# Patient Record
Sex: FEMALE | Race: WHITE | NOT HISPANIC OR LATINO | Employment: FULL TIME | URBAN - METROPOLITAN AREA
[De-identification: names, ages, dates, MRNs, and addresses within clinical notes are randomized per-mention and may not be internally consistent; named-entity substitution may affect disease eponyms.]

---

## 2017-02-15 ENCOUNTER — TELEPHONE (OUTPATIENT)
Dept: OBSTETRICS AND GYNECOLOGY | Facility: CLINIC | Age: 52
End: 2017-02-15

## 2017-02-15 DIAGNOSIS — Z12.31 VISIT FOR SCREENING MAMMOGRAM: Primary | ICD-10-CM

## 2017-02-15 NOTE — TELEPHONE ENCOUNTER
----- Message from Ivory Cervantes sent at 2/15/2017 11:52 AM CST -----  Contact: pt  Pt wants to have a mammo done on 3/15, no orders in system pt wants a call once mammo orders are in to schedule appt, pt can be reached at [phone]///[id number]

## 2017-02-15 NOTE — TELEPHONE ENCOUNTER
Left message informing patient that mammogram was scheduled at 2:45pm on 3/15/2017 [facility] location.

## 2017-03-15 ENCOUNTER — HOSPITAL ENCOUNTER (OUTPATIENT)
Dept: RADIOLOGY | Facility: HOSPITAL | Age: 52
Discharge: HOME | End: 2017-03-15
Attending: OBSTETRICS & GYNECOLOGY
Payer: COMMERCIAL

## 2017-03-15 ENCOUNTER — OFFICE VISIT (OUTPATIENT)
Dept: OBSTETRICS AND GYNECOLOGY | Facility: CLINIC | Age: 52
End: 2017-03-15
Payer: COMMERCIAL

## 2017-03-15 VITALS
DIASTOLIC BLOOD PRESSURE: 78 MMHG | SYSTOLIC BLOOD PRESSURE: 122 MMHG | HEIGHT: 60 IN | BODY MASS INDEX: 28.13 KG/M2 | WEIGHT: 143.31 LBS

## 2017-03-15 DIAGNOSIS — Z01.419 ENCOUNTER FOR GYNECOLOGICAL EXAMINATION: Primary | ICD-10-CM

## 2017-03-15 DIAGNOSIS — Z12.31 VISIT FOR SCREENING MAMMOGRAM: ICD-10-CM

## 2017-03-15 PROCEDURE — 77063 BREAST TOMOSYNTHESIS BI: CPT | Mod: 26,,, | Performed by: RADIOLOGY

## 2017-03-15 PROCEDURE — 77067 SCR MAMMO BI INCL CAD: CPT | Mod: TC

## 2017-03-15 PROCEDURE — 77067 SCR MAMMO BI INCL CAD: CPT | Mod: 26,,, | Performed by: RADIOLOGY

## 2017-03-15 PROCEDURE — 99999 PR PBB SHADOW E&M-EST. PATIENT-LVL II: CPT | Mod: PBBFAC,,, | Performed by: OBSTETRICS & GYNECOLOGY

## 2017-03-15 PROCEDURE — 99396 PREV VISIT EST AGE 40-64: CPT | Mod: S$GLB,,, | Performed by: OBSTETRICS & GYNECOLOGY

## 2017-03-15 NOTE — PROGRESS NOTES
Candi Guzman is a 51 y.o.  who presents for   Chief Complaint   Patient presents with    Gynecologic Exam     Annual Exam- No Problems    - S/P [facility]    Currently sexually active -  mut monog     Candi has a 7 month old grandson named Lennox       Last mammogram 2016 Normal (today)   Last colonoscopy  Normal, repeat in 10 years,per pt      Past Medical History:   Diagnosis Date    Hx of peptic ulcer     Hypertension     Thyroid disease        Past Surgical History:   Procedure Laterality Date     SECTION      x 1    HYSTERECTOMY      [facility], for bleeding, ovaries retained    THYROIDECTOMY      TUBAL LIGATION         Current Outpatient Prescriptions   Medication Sig Dispense Refill    levothyroxine (SYNTHROID) 100 MCG tablet       lisinopril-hydrochlorothiazide (PRINZIDE,ZESTORETIC) 20-12.5 mg per tablet       omeprazole (PRILOSEC) 20 MG capsule Take 20 mg by mouth once daily.       No current facility-administered medications for this visit.        Family History   Problem Relation Age of Onset    Breast cancer Mother 50    Thrombophilia Father      DVTs, spontaneous    Colon cancer Neg Hx     Ovarian cancer Neg Hx        Review of patient's allergies indicates:   Allergen Reactions    Codeine     Dilantin [phenytoin sodium extended]     Meperidine     Morphine     Sulfa (sulfonamide antibiotics)        Social History   Substance Use Topics    Smoking status: Never Smoker    Smokeless tobacco: None    Alcohol use 0.0 oz/week     0 Standard drinks or equivalent per week      Comment: socially       /78  Ht 5' (1.524 m)  Wt 65 kg (143 lb 4.8 oz)  BMI 27.99 kg/m2    ROS:  GENERAL: Doing well and no acute complaints.   BREASTS: No lumps, tenderness, discharge.  GI: No nausea, vomiting, melena, hematochezia.  : No significant incontinence, dysuria, hematuria.  GYN: No unusual discharge, pain, or vasomotor symptoms.    GEN: Pleasant patient in no distress.  Alert and oriented.  HEAD and NECK: Normocephalic. No adenopathy. Thyroid normal to inspection and palpation.  SKIN: No hirsutism or lesions.  BREASTS: Normal to inspection and exam ( no suspicious masses, tenderness, or axillary adenopathy). No discharge.   ABDOMEN:  Flat, soft and non tender. No hernia, mass, hepatomegaly, or RUQT.   PELVIC:      Vulva: normal female genitalia. No lesions.       Urethra: normal        Vagina:  Moist, no abnormal discharge, no significant cystocele or rectocele      Cuff: Intact, well supported and free of lesion. No bladder base tenderness.      Adnexa: No masses, tenderness, or CDS nodularity.          IMPRESSION: nl exam    COUNSELING:  - Encouraged continued calcium and Vitamin D daily and regular weight bearing exercise for osteoporosis prevention.    PLAN: routine f/u

## 2018-01-23 ENCOUNTER — PATIENT MESSAGE (OUTPATIENT)
Dept: OBSTETRICS AND GYNECOLOGY | Facility: CLINIC | Age: 53
End: 2018-01-23

## 2018-01-23 DIAGNOSIS — Z12.31 SCREENING MAMMOGRAM, ENCOUNTER FOR: Primary | ICD-10-CM

## 2018-01-23 NOTE — TELEPHONE ENCOUNTER
Pt was seeing Dr. Jackson, but now is scheduled to come in to see you for annual exam and would like her mammogram on the same day.      Please sign orders for screening mammogram.

## 2018-03-23 ENCOUNTER — PATIENT MESSAGE (OUTPATIENT)
Dept: OBSTETRICS AND GYNECOLOGY | Facility: CLINIC | Age: 53
End: 2018-03-23

## 2018-03-26 ENCOUNTER — PATIENT MESSAGE (OUTPATIENT)
Dept: ADMINISTRATIVE | Facility: OTHER | Age: 53
End: 2018-03-26

## 2018-04-05 ENCOUNTER — PATIENT MESSAGE (OUTPATIENT)
Dept: OBSTETRICS AND GYNECOLOGY | Facility: CLINIC | Age: 53
End: 2018-04-05

## 2018-04-09 ENCOUNTER — PATIENT MESSAGE (OUTPATIENT)
Dept: OBSTETRICS AND GYNECOLOGY | Facility: CLINIC | Age: 53
End: 2018-04-09

## 2018-04-12 ENCOUNTER — OFFICE VISIT (OUTPATIENT)
Dept: OBSTETRICS AND GYNECOLOGY | Facility: CLINIC | Age: 53
End: 2018-04-12
Payer: COMMERCIAL

## 2018-04-12 ENCOUNTER — HOSPITAL ENCOUNTER (OUTPATIENT)
Dept: RADIOLOGY | Facility: HOSPITAL | Age: 53
Discharge: HOME | End: 2018-04-12
Attending: OBSTETRICS & GYNECOLOGY
Payer: COMMERCIAL

## 2018-04-12 VITALS
WEIGHT: 125 LBS | BODY MASS INDEX: 28.07 KG/M2 | WEIGHT: 143 LBS | HEIGHT: 60 IN | BODY MASS INDEX: 24.54 KG/M2 | DIASTOLIC BLOOD PRESSURE: 84 MMHG | SYSTOLIC BLOOD PRESSURE: 122 MMHG | HEIGHT: 60 IN

## 2018-04-12 DIAGNOSIS — Z12.31 SCREENING MAMMOGRAM, ENCOUNTER FOR: ICD-10-CM

## 2018-04-12 DIAGNOSIS — Z01.419 WELL WOMAN EXAM WITH ROUTINE GYNECOLOGICAL EXAM: Primary | ICD-10-CM

## 2018-04-12 PROCEDURE — 77067 SCR MAMMO BI INCL CAD: CPT | Mod: TC,PO

## 2018-04-12 PROCEDURE — 77067 SCR MAMMO BI INCL CAD: CPT | Mod: 26,,, | Performed by: RADIOLOGY

## 2018-04-12 PROCEDURE — 77063 BREAST TOMOSYNTHESIS BI: CPT | Mod: 26,,, | Performed by: RADIOLOGY

## 2018-04-12 PROCEDURE — 99999 PR PBB SHADOW E&M-EST. PATIENT-LVL II: CPT | Mod: PBBFAC,,, | Performed by: OBSTETRICS & GYNECOLOGY

## 2018-04-12 PROCEDURE — 99396 PREV VISIT EST AGE 40-64: CPT | Mod: S$GLB,,, | Performed by: OBSTETRICS & GYNECOLOGY

## 2018-04-12 NOTE — PROGRESS NOTES
CHIEF COMPLAINT   Gynecologic Exam  Chief Complaint   Patient presents with    Gynecologic Exam        HISTORY OF PRESENT ILLNESS  Patient presents for annual exam. The patient has no complaints today.     No LMP recorded. Patient has had a hysterectomy. benign indications.  Ovaries remain.       Reports no bowel movement irregularities from baseline, bloating, or weight loss   ERT:   None        Health Maintenance   Topic Date Due    Hepatitis C Screening  1965    TETANUS VACCINE  1983    Colonoscopy  2015    Influenza Vaccine  2017    Lipid Panel  2017    Mammogram  2019       HISTORY  Patient Active Problem List   Diagnosis   (none) - all problems resolved or deleted       Past Medical History:   Diagnosis Date    Hx of peptic ulcer     Hypertension     Thyroid disease        Past Surgical History:   Procedure Laterality Date    BREAST BIOPSY Left 2015     SECTION      x 1    HYSTERECTOMY      [facility], for bleeding, ovaries retained    THYROIDECTOMY      TUBAL LIGATION         Family History   Problem Relation Age of Onset    Breast cancer Mother 50    Thrombophilia Father      DVTs, spontaneous    Colon cancer Neg Hx     Ovarian cancer Neg Hx        Social History     Social History    Marital status:      Spouse name: N/A    Number of children: N/A    Years of education: N/A     Social History Main Topics    Smoking status: Never Smoker    Smokeless tobacco: Never Used    Alcohol use 0.0 oz/week      Comment: socially    Drug use: No    Sexual activity: Yes     Partners: Male     Birth control/ protection: Surgical      Comment: hyst; mut monog     Other Topics Concern    None     Social History Narrative    None       Current Outpatient Prescriptions   Medication Sig Dispense Refill    levothyroxine (SYNTHROID) 100 MCG tablet       lisinopril-hydrochlorothiazide (PRINZIDE,ZESTORETIC) 20-12.5 mg per tablet       omeprazole  (PRILOSEC) 20 MG capsule Take 20 mg by mouth once daily.       No current facility-administered medications for this visit.        Review of patient's allergies indicates:   Allergen Reactions    Codeine     Dilantin [phenytoin sodium extended]     Meperidine     Morphine     Sulfa (sulfonamide antibiotics)          PHYSICAL EXAM     Vitals:    04/12/18 0744   BP: 122/84       PAIN SCALE: 0/10 None    ROS:  GENERAL: No fever, chills, fatigability or weight loss.  ABDOMEN: Appetite fine. No weight loss. Denies diarrhea, abdominal pain, hematemesis or blood in stool.  No change in bowel movement pattern.  URINARY: No flank pain, dysuria or hematuria.  REPRODUCTIVE: No abnormal vaginal bleeding.  BREASTS: Breasts symmetric, nontender and no lumps detected.    PE:   APPEARANCE: Well nourished, well developed, in no acute distress.  NECK: Neck symmetric without masses (s/p  Thyroidectomy)      NODES: No inguinal lymph node enlargement.  ABDOMEN: Soft. No tenderness or masses. No hepatosplenomegaly. No hernias.  BREASTS: Symmetrical, no skin changes or visible lesions. No palpable masses, nipple discharge or adenopathy bilaterally.    PELVIC:   VULVA: No lesions. Normal female genitalia.  URETHRAL MEATUS: Normal size and location, no lesions, no prolapse.  URETHRA: No masses, tenderness, prolapse or scarring.  PELVIC: Normal external female genitalia without lesions. Normal hair distribution. Adequate perineal body, normal urethral meatus. Vagina moist and well rugated without lesions or discharge. No significant cystocele or rectocele. Uterus and cervix surgically absent. Bimanual exam revealed no masses, tenderness or abnormality.    ANUS, PERINEUM: no masses, external hemorrhoids noted.       DIAGNOSIS:   1. Annual exam      PLAN:    Mammogram  Colonoscopy  dexa     MEDICATIONS PRESCRIBED: calcium vitamin D weight bearing exercise       COUNSELING:  Patient was counseled today on A.C.S. Pap guidelines and  recommendations for yearly pelvic exams, mammograms and monthly self breast exams; to see her PCP for other health maintenance.        FOLLOW-UP: With me in 1 year.

## 2018-04-15 ENCOUNTER — PATIENT MESSAGE (OUTPATIENT)
Dept: OBSTETRICS AND GYNECOLOGY | Facility: CLINIC | Age: 53
End: 2018-04-15

## 2018-04-16 ENCOUNTER — TELEPHONE (OUTPATIENT)
Dept: OBSTETRICS AND GYNECOLOGY | Facility: CLINIC | Age: 53
End: 2018-04-16

## 2018-04-16 NOTE — PROGRESS NOTES
Mammogram normal but radiology calculated risk is over 20%.  Please schedule with Jany Clifford for counseling on possible further testing.

## 2018-04-16 NOTE — TELEPHONE ENCOUNTER
----- Message from Sarah Florentino MD sent at 4/16/2018  9:12 AM CDT -----  Mammogram normal but radiology calculated risk is over 20%.  Please schedule with Jany Parry for counseling on possible further testing.

## 2018-04-17 ENCOUNTER — TELEPHONE (OUTPATIENT)
Dept: HEMATOLOGY/ONCOLOGY | Facility: CLINIC | Age: 53
End: 2018-04-17

## 2018-04-17 NOTE — TELEPHONE ENCOUNTER
Attempted to contact patient about upcoming appointment. Scheduled for 5/16/18 at 200pm. No answer. Left voicemail with callback number.

## 2018-04-25 ENCOUNTER — PATIENT MESSAGE (OUTPATIENT)
Dept: OBSTETRICS AND GYNECOLOGY | Facility: CLINIC | Age: 53
End: 2018-04-25

## 2018-04-25 DIAGNOSIS — R92.8 BREAST LESION ON MAMMOGRAPHY: Primary | ICD-10-CM

## 2018-04-26 ENCOUNTER — TELEPHONE (OUTPATIENT)
Dept: NEPHROLOGY | Facility: CLINIC | Age: 53
End: 2018-04-26

## 2018-04-26 ENCOUNTER — TELEPHONE (OUTPATIENT)
Dept: OBSTETRICS AND GYNECOLOGY | Facility: CLINIC | Age: 53
End: 2018-04-26

## 2018-04-26 NOTE — TELEPHONE ENCOUNTER
Spoke to patient and read verbatim her ultrasound report. Patient had not seen Dr. Florentino's message about her ultrasound results and the reasoning behind seeing breast specialist.  Patient concerned because of family history of breast CA.  Patient thanked me for calling her and will proceed with appointments as scheduled.

## 2018-04-26 NOTE — TELEPHONE ENCOUNTER
----- Message from Ruth Lua sent at 4/26/2018  8:44 AM CDT -----  Contact: pt  Calling about a missed call and please advise. [phone]

## 2018-04-26 NOTE — TELEPHONE ENCOUNTER
pls tell pt I spoke w adalberto chiu and she recommended indeed to get additional mammogram imaging along with an ultrasound before the visit with her. Please schedule.

## 2018-04-26 NOTE — TELEPHONE ENCOUNTER
----- Message from Marlene Corrales sent at 4/26/2018  1:46 PM CDT -----  Contact: pt  She's calling in regards to speak with nurse pls call pt back at  [phone]

## 2018-05-08 ENCOUNTER — TELEPHONE (OUTPATIENT)
Dept: RADIOLOGY | Facility: HOSPITAL | Age: 53
End: 2018-05-08

## 2018-05-08 ENCOUNTER — HOSPITAL ENCOUNTER (OUTPATIENT)
Dept: RADIOLOGY | Facility: HOSPITAL | Age: 53
Discharge: HOME | End: 2018-05-08
Attending: OBSTETRICS & GYNECOLOGY
Payer: COMMERCIAL

## 2018-05-08 DIAGNOSIS — R92.8 BREAST LESION ON MAMMOGRAPHY: ICD-10-CM

## 2018-05-08 PROCEDURE — 77061 BREAST TOMOSYNTHESIS UNI: CPT | Mod: 26,LT,, | Performed by: RADIOLOGY

## 2018-05-08 PROCEDURE — 76642 ULTRASOUND BREAST LIMITED: CPT | Mod: 26,LT,, | Performed by: RADIOLOGY

## 2018-05-08 PROCEDURE — 77061 BREAST TOMOSYNTHESIS UNI: CPT | Mod: TC,PO,LT

## 2018-05-08 PROCEDURE — 77065 DX MAMMO INCL CAD UNI: CPT | Mod: TC,PO,LT

## 2018-05-08 PROCEDURE — 76642 ULTRASOUND BREAST LIMITED: CPT | Mod: TC,PO,LT

## 2018-05-08 PROCEDURE — 77065 DX MAMMO INCL CAD UNI: CPT | Mod: 26,LT,, | Performed by: RADIOLOGY

## 2018-05-09 ENCOUNTER — OFFICE VISIT (OUTPATIENT)
Dept: SURGERY | Facility: CLINIC | Age: 53
End: 2018-05-09
Payer: COMMERCIAL

## 2018-05-09 ENCOUNTER — PATIENT MESSAGE (OUTPATIENT)
Dept: SURGERY | Facility: CLINIC | Age: 53
End: 2018-05-09

## 2018-05-09 ENCOUNTER — TELEPHONE (OUTPATIENT)
Dept: OBSTETRICS AND GYNECOLOGY | Facility: CLINIC | Age: 53
End: 2018-05-09

## 2018-05-09 VITALS
WEIGHT: 125 LBS | DIASTOLIC BLOOD PRESSURE: 83 MMHG | TEMPERATURE: 98 F | BODY MASS INDEX: 24.41 KG/M2 | HEART RATE: 99 BPM | SYSTOLIC BLOOD PRESSURE: 141 MMHG

## 2018-05-09 DIAGNOSIS — R92.8 ABNORMAL MAMMOGRAM OF LEFT BREAST: Primary | ICD-10-CM

## 2018-05-09 PROCEDURE — 3008F BODY MASS INDEX DOCD: CPT | Mod: CPTII,S$GLB,, | Performed by: SURGERY

## 2018-05-09 PROCEDURE — 99999 PR PBB SHADOW E&M-EST. PATIENT-LVL III: CPT | Mod: PBBFAC,,, | Performed by: SURGERY

## 2018-05-09 PROCEDURE — 99204 OFFICE O/P NEW MOD 45 MIN: CPT | Mod: S$GLB,,, | Performed by: SURGERY

## 2018-05-09 NOTE — LETTER
May 9, 2018      Sarah Florentino MD  [address]  [facility] [address]           [facility]  [address] [place] [id number]  Phone: [phone]  Fax: [phone]          Patient: Candi Guzman   MR Number: [id number]   YOB: 1965   Date of Visit: 5/9/2018       Dear Dr. Sarah Florentino:    Thank you for referring Candi Guzman to me for evaluation. Attached you will find relevant portions of my assessment and plan of care.    If you have questions, please do not hesitate to call me. I look forward to following Candi Guzman along with you.    Sincerely,    Arthur Hoyos MD    Enclosure  CC:  No Recipients    If you would like to receive this communication electronically, please contact [email or web address] or [phone] to request more information on [employer] access.    For providers and/or their staff who would like to refer a patient to [facility], please contact us through our one-stop-shop provider referral line, Kayleigh Mayorga, at [phone].    If you feel you have received this communication in error or would no longer like to receive these types of communications, please e-mail [email or web address]

## 2018-05-09 NOTE — TELEPHONE ENCOUNTER
Spoke to patient and let her know that [facility] will have to have a signed release in order to send us records.  She will have to sign at her appointment today.  Patient verbalized understanding.

## 2018-05-09 NOTE — TELEPHONE ENCOUNTER
----- Message from Solange Kaye sent at 5/9/2018  8:52 AM CDT -----  Contact: pt  States she need us to call and request her mammogram records, DOS 1/2/15 from [facility], she has an appt with Dr Hoyos this afternoon. Please call pt [phone] or cell# [phone]. Thank you

## 2018-05-09 NOTE — PROGRESS NOTES
History & Physical    SUBJECTIVE:     History of Present Illness:  Patient is a 52 y.o. female referred for abnormal mammogram of left breast.  Patient denies any palpable breast masses, nipple discharge, breast pain or any other changes.  She previously underwent a left breast biopsy a few years ago which was benign.  Mother was diagnosed with breast cancer in her 50s.  Menarche age age 9,  1st at 23, menopause unsure of age, no HRT    Chief Complaint   Patient presents with    Consult       Review of patient's allergies indicates:   Allergen Reactions    Codeine     Dilantin [phenytoin sodium extended]     Meperidine     Morphine     Sulfa (sulfonamide antibiotics)        Current Outpatient Prescriptions   Medication Sig Dispense Refill    levothyroxine (SYNTHROID) 100 MCG tablet 88 mcg.       lisinopril-hydrochlorothiazide (PRINZIDE,ZESTORETIC) 20-12.5 mg per tablet 1 tablet.       omeprazole (PRILOSEC) 20 MG capsule Take 20 mg by mouth once daily.       No current facility-administered medications for this visit.        Past Medical History:   Diagnosis Date    Hx of peptic ulcer     Hypertension     Thyroid disease      Past Surgical History:   Procedure Laterality Date    BREAST BIOPSY Left 2015     SECTION      x 1    HYSTERECTOMY  2009    [facility], for bleeding, ovaries retained    THYROIDECTOMY      TUBAL LIGATION       Family History   Problem Relation Age of Onset    Breast cancer Mother 50    Thrombophilia Father         DVTs, spontaneous    Colon cancer Neg Hx     Ovarian cancer Neg Hx      Social History   Substance Use Topics    Smoking status: Never Smoker    Smokeless tobacco: Never Used    Alcohol use 0.0 oz/week      Comment: socially        Review of Systems:  Review of Systems   Constitutional: Negative for activity change, appetite change, chills, diaphoresis, fatigue, fever and unexpected weight change.   HENT: Negative for congestion, dental problem, rhinorrhea  and sore throat.    Eyes: Negative for visual disturbance.   Respiratory: Negative for cough, chest tightness, shortness of breath, wheezing and stridor.    Cardiovascular: Negative for chest pain, palpitations and leg swelling.   Gastrointestinal: Negative for abdominal distention, abdominal pain, constipation, diarrhea, nausea and vomiting.   Endocrine: Negative for cold intolerance, heat intolerance, polydipsia, polyphagia and polyuria.   Genitourinary: Negative for difficulty urinating, dysuria, frequency, hematuria and urgency.   Musculoskeletal: Negative for arthralgias, gait problem, myalgias and neck pain.   Skin: Negative for color change, pallor, rash and wound.   Neurological: Negative for dizziness, syncope, weakness, light-headedness, numbness and headaches.   Hematological: Negative for adenopathy. Does not bruise/bleed easily.   Psychiatric/Behavioral: Negative for confusion, decreased concentration and sleep disturbance. The patient is not nervous/anxious.        OBJECTIVE:     Vital Signs (Most Recent)  Temp: 98 °F (36.7 °C) (05/09/18 1326)  Pulse: 99 (05/09/18 1326)  BP: (!) 141/83 (05/09/18 1326)     56.7 kg (125 lb)     Physical Exam:  Physical Exam   Constitutional: She is oriented to person, place, and time. She appears well-developed and well-nourished. No distress.   HENT:   Head: Normocephalic and atraumatic.   Right Ear: External ear normal.   Left Ear: External ear normal.   Eyes: Conjunctivae and EOM are normal. Pupils are equal, round, and reactive to light. No scleral icterus.   Neck: Normal range of motion. Neck supple. No tracheal deviation present. No thyromegaly present.   Cardiovascular: Normal rate, regular rhythm, normal heart sounds and intact distal pulses.  Exam reveals no gallop and no friction rub.    No murmur heard.  Pulmonary/Chest: Effort normal and breath sounds normal. No respiratory distress. She has no wheezes. She has no rales. She exhibits no tenderness. Right  breast exhibits no inverted nipple, no mass, no nipple discharge, no skin change and no tenderness. Left breast exhibits no inverted nipple, no mass, no nipple discharge, no skin change and no tenderness.   Abdominal: Soft. Bowel sounds are normal. She exhibits no distension. There is no tenderness. No hernia.   Musculoskeletal: Normal range of motion. She exhibits no edema, tenderness or deformity.   Lymphadenopathy:     She has no cervical adenopathy.   Neurological: She is alert and oriented to person, place, and time.   Skin: Skin is warm and dry. No rash noted. She is not diaphoretic. No erythema. No pallor.   Psychiatric: She has a normal mood and affect. Her behavior is normal. Judgment and thought content normal.   Vitals reviewed.        Diagnostic Results:  Narrative & Impression     Result:   Mammo Digital Diagnostic Left with Tomosynthesis_CAD  US Breast Left Limited     History:  Patient is 52 y.o. and is seen for a diagnostic mammogram.     Films Compared:  04/12/2018 Mammo Digital Screening Bilat with Tomosynthesis_CAD, 03/15/2017 Mammo Digital Screening Bilat with Tomosynthesis_CAD, 02/17/2016 Mammo Digital Screening Bilat with Tomosynthesis_CAD, 12/22/2014 Mammo Digital Diagnostic Bilat with Tomosynthesis_CAD, 12/17/2014 Mammo Digital Screening Bilat with Tomosynthesis_CAD, and 11/22/2013 Mammo Digital Screening Bilat With CAD     Findings:  This procedure was performed using tomosynthesis.  Computer-aided detection was utilized in the interpretation of this examination.  US Breast Left Limited  There is a 7 mm x 5 mm x 8 mm irregularly shaped mass with angular margins seen in the left breast at 2 o'clock. There are a couple of more hyperechoic areas within the lesion that are suspicious for calcifications.      There is a 10 mm x 5 mm x 10 mm oval, parallel, hypoechoic mass with circumscribed margins with no posterior features seen in the left breast at 1 o'clock. This lesion is thought to be the  same lesion as seen on an ultrasound from 2007 and is thought to be smaller. The lesion is also thought to be partially visible and unchanged when compared to the CC view of a mammogram dating back to 2014. This lesion is therefore considered benign.     Mammo Digital Diagnostic Left with Tomosynthesis_CAD  The breast is heterogeneously dense, which may obscure small masses.     There is an oval mass with circumscribed margins seen in the left breast at 1 o'clock.       Finding not seen on this modality: mass.      Impression:  Left  Mass: Left breast 7 mm x 5 mm x 8 mm mass at the 2 o'clock position. Assessment: 4 - Suspicious finding. Biopsy is recommended.      BI-RADS Category:   Left: 4 - Suspicious  Overall: 4 - Suspicious     Recommendation:     Biopsy is recommended.     The patient's estimated lifetime risk of breast cancer (to age 85) based on Tyrer-Cuzick - 7 risk assessment model is: Tyrer-Cuzick: 20.82 %. According to the American Cancer Society,  patients with a lifetime breast cancer risk of 20% or higher might benefit from supplemental screening tests.         ASSESSMENT/PLAN:     A 52-year-old female with abnormal mammogram left breast, high risk breast cancer    PLAN:Plan     Ultrasound-guided core biopsy left breast  Call patient with results  Also discussed that she falls into the high risk category for breast cancer screening.  We will schedule next imaging at appropriate time following biopsy results

## 2018-05-14 ENCOUNTER — HOSPITAL ENCOUNTER (OUTPATIENT)
Dept: RADIOLOGY | Facility: HOSPITAL | Age: 53
Discharge: HOME | End: 2018-05-14
Attending: SURGERY
Payer: COMMERCIAL

## 2018-05-14 DIAGNOSIS — R92.8 ABNORMAL MAMMOGRAM OF LEFT BREAST: ICD-10-CM

## 2018-05-14 PROCEDURE — 88305 TISSUE EXAM BY PATHOLOGIST: CPT | Mod: 26,,, | Performed by: PATHOLOGY

## 2018-05-14 PROCEDURE — 19083 BX BREAST 1ST LESION US IMAG: CPT | Mod: LT,,, | Performed by: RADIOLOGY

## 2018-05-14 PROCEDURE — 19083 BX BREAST 1ST LESION US IMAG: CPT | Mod: PO

## 2018-05-14 PROCEDURE — 88305 TISSUE EXAM BY PATHOLOGIST: CPT | Performed by: PATHOLOGY

## 2018-05-14 NOTE — NURSING
Pressure held on left breast biopsy site for 10 mins, hemostasis was achieved, steri strips were applied, and wound was covered with 4x4 gauze and a tegaderm. Dressing clean, dry and intact with no drainage noted.  Discharge instructions given verbally and in writing, patient voiced understandings.  Patient discharged and accompanied by family member.

## 2018-05-17 ENCOUNTER — TELEPHONE (OUTPATIENT)
Dept: SURGERY | Facility: CLINIC | Age: 53
End: 2018-05-17

## 2018-05-17 ENCOUNTER — PATIENT MESSAGE (OUTPATIENT)
Dept: SURGERY | Facility: CLINIC | Age: 53
End: 2018-05-17

## 2018-05-17 NOTE — TELEPHONE ENCOUNTER
----- Message from Julio Cesar Hanna sent at 5/17/2018 12:53 PM CDT -----  Contact: self [phone]  Would like to consult with nurse regarding results.  Please call back at [phone].  Md Yaquelin

## 2018-05-23 ENCOUNTER — PATIENT MESSAGE (OUTPATIENT)
Dept: SURGERY | Facility: CLINIC | Age: 53
End: 2018-05-23

## 2018-05-31 ENCOUNTER — PATIENT MESSAGE (OUTPATIENT)
Dept: SURGERY | Facility: HOSPITAL | Age: 53
End: 2018-05-31

## 2018-05-31 DIAGNOSIS — R92.8 ABNORMAL MAMMOGRAM OF LEFT BREAST: Primary | ICD-10-CM

## 2018-05-31 NOTE — TELEPHONE ENCOUNTER
Pathology discussed with patient:  FINAL PATHOLOGIC DIAGNOSIS  CORE BIOPSIES OF UPPER OUTER QUADRANT OF LEFT BREAST:  PARTIALLY SCLEROSED INTRADUCTAL PAPILLOMA WITHOUT ATYPIA IDENTIFIED  CONCURRING PATHOLOGIST: DR. POWELL  Path is concordant    6 month interval imaging with follow up in clinic for breast exam

## 2018-11-12 ENCOUNTER — HOSPITAL ENCOUNTER (OUTPATIENT)
Dept: RADIOLOGY | Facility: HOSPITAL | Age: 53
Discharge: HOME | End: 2018-11-12
Attending: SURGERY
Payer: COMMERCIAL

## 2018-11-12 ENCOUNTER — OFFICE VISIT (OUTPATIENT)
Dept: SURGERY | Facility: CLINIC | Age: 53
End: 2018-11-12
Payer: COMMERCIAL

## 2018-11-12 VITALS — WEIGHT: 125 LBS | BODY MASS INDEX: 24.54 KG/M2 | HEIGHT: 60 IN

## 2018-11-12 VITALS
HEART RATE: 79 BPM | SYSTOLIC BLOOD PRESSURE: 120 MMHG | DIASTOLIC BLOOD PRESSURE: 73 MMHG | TEMPERATURE: 99 F | BODY MASS INDEX: 26.23 KG/M2 | WEIGHT: 133.63 LBS | HEIGHT: 60 IN

## 2018-11-12 DIAGNOSIS — R92.8 ABNORMAL MAMMOGRAM OF LEFT BREAST: ICD-10-CM

## 2018-11-12 DIAGNOSIS — R92.8 ABNORMAL MAMMOGRAM OF LEFT BREAST: Primary | ICD-10-CM

## 2018-11-12 PROCEDURE — 76642 ULTRASOUND BREAST LIMITED: CPT | Mod: TC,PO,LT

## 2018-11-12 PROCEDURE — 99999 PR PBB SHADOW E&M-EST. PATIENT-LVL III: CPT | Mod: PBBFAC,,, | Performed by: SURGERY

## 2018-11-12 PROCEDURE — 77061 BREAST TOMOSYNTHESIS UNI: CPT | Mod: 26,LT,, | Performed by: RADIOLOGY

## 2018-11-12 PROCEDURE — 99213 OFFICE O/P EST LOW 20 MIN: CPT | Mod: S$GLB,,, | Performed by: SURGERY

## 2018-11-12 PROCEDURE — 77065 DX MAMMO INCL CAD UNI: CPT | Mod: TC,PO,LT

## 2018-11-12 PROCEDURE — 3008F BODY MASS INDEX DOCD: CPT | Mod: CPTII,S$GLB,, | Performed by: SURGERY

## 2018-11-12 PROCEDURE — 77065 DX MAMMO INCL CAD UNI: CPT | Mod: 26,LT,, | Performed by: RADIOLOGY

## 2018-11-12 PROCEDURE — 76642 ULTRASOUND BREAST LIMITED: CPT | Mod: 26,LT,, | Performed by: RADIOLOGY

## 2018-11-12 PROCEDURE — 77061 BREAST TOMOSYNTHESIS UNI: CPT | Mod: TC,PO,LT

## 2018-11-12 RX ORDER — ASPIRIN 81 MG/1
81 TABLET ORAL DAILY
COMMUNITY

## 2018-11-12 RX ORDER — AMOXICILLIN 500 MG
CAPSULE ORAL DAILY
COMMUNITY

## 2018-11-12 NOTE — PROGRESS NOTES
History & Physical    SUBJECTIVE:     History of Present Illness:  Patient is a 53 y.o. female presents for 6 month interval imaging and breast exam.  She reports no changes in her breast and denies any breast masses nipple discharge or breast pain.    Biopsy showed PARTIALLY SCLEROSED INTRADUCTAL PAPILLOMA WITHOUT ATYPIA IDENTIFIED    Initially referred for abnormal mammogram of left breast.  Patient denies any palpable breast masses, nipple discharge, breast pain or any other changes.  She previously underwent a left breast biopsy a few years ago which was benign.  Mother was diagnosed with breast cancer in her 50s.  Menarche age age 9,  1st at 23, menopause unsure of age, no HRT    Chief Complaint   Patient presents with    Follow-up     6 months       Review of patient's allergies indicates:   Allergen Reactions    Codeine     Dilantin [phenytoin sodium extended]     Meperidine     Morphine     Sulfa (sulfonamide antibiotics)        Current Outpatient Medications   Medication Sig Dispense Refill    aspirin (ECOTRIN) 81 MG EC tablet Take 81 mg by mouth once daily.      fish oil-omega-3 fatty acids 300-1,000 mg capsule Take by mouth once daily.      levothyroxine (SYNTHROID) 100 MCG tablet 88 mcg.       lisinopril-hydrochlorothiazide (PRINZIDE,ZESTORETIC) 20-12.5 mg per tablet 1 tablet.       omeprazole (PRILOSEC) 20 MG capsule Take 20 mg by mouth once daily.       No current facility-administered medications for this visit.        Past Medical History:   Diagnosis Date    Hx of peptic ulcer     Hypertension     Thyroid disease      Past Surgical History:   Procedure Laterality Date    BREAST BIOPSY Left      SECTION      x 1    HYSTERECTOMY  2009    TL, for bleeding, ovaries retained    THYROIDECTOMY      TUBAL LIGATION       Family History   Problem Relation Age of Onset    Breast cancer Mother 50    Thrombophilia Father         DVTs, spontaneous    Colon cancer Neg Hx      Ovarian cancer Neg Hx      Social History     Tobacco Use    Smoking status: Never Smoker    Smokeless tobacco: Never Used   Substance Use Topics    Alcohol use: Yes     Alcohol/week: 0.0 oz     Comment: socially    Drug use: No        Review of Systems:  Review of Systems   Constitutional: Negative for activity change, appetite change, chills, diaphoresis, fatigue, fever and unexpected weight change.   HENT: Negative for congestion, dental problem, rhinorrhea and sore throat.    Eyes: Negative for visual disturbance.   Respiratory: Negative for cough, chest tightness, shortness of breath, wheezing and stridor.    Cardiovascular: Negative for chest pain, palpitations and leg swelling.   Gastrointestinal: Negative for abdominal distention, abdominal pain, constipation, diarrhea, nausea and vomiting.   Endocrine: Negative for cold intolerance, heat intolerance, polydipsia, polyphagia and polyuria.   Genitourinary: Negative for difficulty urinating, dysuria, frequency, hematuria and urgency.   Musculoskeletal: Negative for arthralgias, gait problem, myalgias and neck pain.   Skin: Negative for color change, pallor, rash and wound.   Neurological: Negative for dizziness, syncope, weakness, light-headedness, numbness and headaches.   Hematological: Negative for adenopathy. Does not bruise/bleed easily.   Psychiatric/Behavioral: Negative for confusion, decreased concentration and sleep disturbance. The patient is not nervous/anxious.        OBJECTIVE:     Vital Signs (Most Recent)  Temp: 98.7 °F (37.1 °C) (11/12/18 0921)  Pulse: 79 (11/12/18 0921)  BP: 120/73 (11/12/18 0921)  5' (1.524 m)  60.6 kg (133 lb 9.6 oz)     Physical Exam:  Physical Exam   Constitutional: She is oriented to person, place, and time. She appears well-developed and well-nourished. No distress.   HENT:   Head: Normocephalic and atraumatic.   Right Ear: External ear normal.   Left Ear: External ear normal.   Eyes: Conjunctivae and EOM are normal.  Pupils are equal, round, and reactive to light. No scleral icterus.   Neck: Normal range of motion. Neck supple. No tracheal deviation present. No thyromegaly present.   Cardiovascular: Normal rate, regular rhythm, normal heart sounds and intact distal pulses. Exam reveals no gallop and no friction rub.   No murmur heard.  Pulmonary/Chest: Effort normal and breath sounds normal. No respiratory distress. She has no wheezes. She has no rales. She exhibits no tenderness. Right breast exhibits no inverted nipple, no mass, no nipple discharge, no skin change and no tenderness. Left breast exhibits no inverted nipple, no mass, no nipple discharge, no skin change and no tenderness.   Abdominal: Soft. Bowel sounds are normal. She exhibits no distension. There is no tenderness. No hernia.   Musculoskeletal: Normal range of motion. She exhibits no edema, tenderness or deformity.   Lymphadenopathy:     She has no cervical adenopathy.   Neurological: She is alert and oriented to person, place, and time.   Skin: Skin is warm and dry. No rash noted. She is not diaphoretic. No erythema. No pallor.   Psychiatric: She has a normal mood and affect. Her behavior is normal. Judgment and thought content normal.   Vitals reviewed.    Narrative & Impression     Result:   Mammo Digital Diagnostic Left w/ Ry  US Breast Left Limited     History:  Patient is 53 y.o. and is seen for a diagnostic mammogram.     Films Compared:  Compared to: 05/14/2018 Mammo Digital Diagnostic Post Procedure_Mammo Guide_Clip_Needle Loc only, 05/08/2018 Mammo Digital Diagnostic Left with Tomosynthesis_CAD, 04/12/2018 Mammo Digital Screening Bilat with Tomosynthesis_CAD, and 03/15/2017 Mammo Digital Screening Bilat with Tomosynthesis_CAD     Findings:  This procedure was performed using tomosynthesis.  Computer-aided detection was utilized in the interpretation of this examination.  The breast is heterogeneously dense, which may obscure small masses.      Mammo  Digital Diagnostic Left w/ Ry  Left  Mass:  Finding not seen on this modality: mass.      US Breast Left Limited  Left  Mass: There is an 8 mm x 5 mm x 8 mm oval, heterogeneous mass with enhancement seen in the left breast at 2 o'clock. Biopsy marker clip is seen in place.  Pathology from interim core biopsy yielded a benign result.      Impression:  There is no mammographic or sonographic evidence of malignancy.     BI-RADS Category:   Left: 2 - Benign  Overall: 2 - Benign     Recommendation:  Routine screening mammogram in 1 year is recommended.     The patient's estimated lifetime risk of breast cancer (to age 85) based on Tyrer-Cuzick - 7 risk assessment model is: Tyrer-Cuzick: 18.02 %. According to the [employer],  patients with a lifetime breast cancer risk of 20% or higher might benefit from supplemental screening tests.         ASSESSMENT/PLAN:     A 52-year-old female with abnormal mammogram left breast    PLAN:Plan     Normal breast exam  Breast imaging normal  Okay to resume annual mammograms with PCP or OB

## 2019-10-23 DIAGNOSIS — Z12.39 BREAST CANCER SCREENING: Primary | ICD-10-CM

## 2019-11-06 ENCOUNTER — TELEPHONE (OUTPATIENT)
Dept: OBSTETRICS AND GYNECOLOGY | Facility: CLINIC | Age: 54
End: 2019-11-06

## 2019-11-06 NOTE — TELEPHONE ENCOUNTER
Called pt to reschedule appointment w/ Dr. Florentino because she will be out of the office. No answer, left message for pt to return call to clinic.

## 2019-11-06 NOTE — TELEPHONE ENCOUNTER
Returned pt call to reschedule appointment w/ Dr. Florentino because she will be out of the office. Confirmed, 1/9/19 at 845am. PT verbalized understanding.

## 2019-11-06 NOTE — TELEPHONE ENCOUNTER
----- Message from Ju Carranza sent at 11/6/2019  2:31 PM CST -----  Patient returning call [phone]

## 2020-01-09 ENCOUNTER — OFFICE VISIT (OUTPATIENT)
Dept: OBSTETRICS AND GYNECOLOGY | Facility: CLINIC | Age: 55
End: 2020-01-09
Payer: COMMERCIAL

## 2020-01-09 ENCOUNTER — HOSPITAL ENCOUNTER (OUTPATIENT)
Dept: RADIOLOGY | Facility: HOSPITAL | Age: 55
Discharge: HOME | End: 2020-01-09
Attending: OBSTETRICS & GYNECOLOGY
Payer: COMMERCIAL

## 2020-01-09 VITALS
SYSTOLIC BLOOD PRESSURE: 112 MMHG | WEIGHT: 136.88 LBS | DIASTOLIC BLOOD PRESSURE: 82 MMHG | BODY MASS INDEX: 26.74 KG/M2

## 2020-01-09 DIAGNOSIS — Z01.419 WELL WOMAN EXAM WITH ROUTINE GYNECOLOGICAL EXAM: Primary | ICD-10-CM

## 2020-01-09 DIAGNOSIS — Z12.39 BREAST CANCER SCREENING: ICD-10-CM

## 2020-01-09 PROCEDURE — 99396 PR PREVENTIVE VISIT,EST,40-64: ICD-10-PCS | Mod: S$GLB,,, | Performed by: OBSTETRICS & GYNECOLOGY

## 2020-01-09 PROCEDURE — 77067 SCR MAMMO BI INCL CAD: CPT | Mod: TC

## 2020-01-09 PROCEDURE — 77067 SCR MAMMO BI INCL CAD: CPT | Mod: 26,,, | Performed by: RADIOLOGY

## 2020-01-09 PROCEDURE — 99396 PREV VISIT EST AGE 40-64: CPT | Mod: S$GLB,,, | Performed by: OBSTETRICS & GYNECOLOGY

## 2020-01-09 PROCEDURE — 77063 BREAST TOMOSYNTHESIS BI: CPT | Mod: 26,,, | Performed by: RADIOLOGY

## 2020-01-09 PROCEDURE — 77067 MAMMO DIGITAL SCREENING BILAT WITH TOMOSYNTHESIS_CAD: ICD-10-PCS | Mod: 26,,, | Performed by: RADIOLOGY

## 2020-01-09 PROCEDURE — 99999 PR PBB SHADOW E&M-EST. PATIENT-LVL II: CPT | Mod: PBBFAC,,, | Performed by: OBSTETRICS & GYNECOLOGY

## 2020-01-09 PROCEDURE — 99999 PR PBB SHADOW E&M-EST. PATIENT-LVL II: ICD-10-PCS | Mod: PBBFAC,,, | Performed by: OBSTETRICS & GYNECOLOGY

## 2020-01-09 PROCEDURE — 77063 MAMMO DIGITAL SCREENING BILAT WITH TOMOSYNTHESIS_CAD: ICD-10-PCS | Mod: 26,,, | Performed by: RADIOLOGY

## 2020-01-09 RX ORDER — LISINOPRIL 20 MG/1
20 TABLET ORAL
COMMUNITY
Start: 2019-06-20 | End: 2020-06-19

## 2020-01-09 RX ORDER — BENZONATATE 200 MG/1
CAPSULE ORAL
COMMUNITY
Start: 2019-12-16

## 2020-01-09 RX ORDER — MULTIVIT WITH IRON,MINERALS
2 TABLET,CHEWABLE ORAL
COMMUNITY

## 2020-01-09 RX ORDER — MONTELUKAST SODIUM 10 MG/1
10 TABLET ORAL
COMMUNITY
Start: 2019-06-20 | End: 2020-06-19

## 2020-01-09 RX ORDER — LISINOPRIL AND HYDROCHLOROTHIAZIDE 12.5; 2 MG/1; MG/1
1 TABLET ORAL DAILY
Qty: 30 TABLET | Refills: 0 | Status: SHIPPED | OUTPATIENT
Start: 2020-01-09

## 2020-01-09 RX ORDER — LEVOTHYROXINE SODIUM 100 UG/1
88 TABLET ORAL
Qty: 28 TABLET | Refills: 0 | Status: SHIPPED | OUTPATIENT
Start: 2020-01-09 | End: 2020-01-15

## 2020-01-09 NOTE — PROGRESS NOTES
CHIEF COMPLAINT   Gynecologic Exam  Chief Complaint   Patient presents with    Well Woman        HISTORY OF PRESENT ILLNESS  Candi Guzman   presents for annual exam. The patient has no complaints today.     No LMP recorded. Patient has had a hysterectomy. benign indications.  Ovaries remain.       Reports no bowel movement irregularities from baseline, bloating, or weight loss.    ERT:   None       Health Maintenance   Topic Date Due    Hepatitis C Screening  1965    TETANUS VACCINE  1983    Colonoscopy  2015    Mammogram  2020    Lipid Panel  2023       HISTORY  Patient Active Problem List   Diagnosis   (none) - all problems resolved or deleted       Past Medical History:   Diagnosis Date    Hx of peptic ulcer     Hypertension     Thyroid disease        Past Surgical History:   Procedure Laterality Date    BREAST BIOPSY Left 2015     SECTION      x 1    HYSTERECTOMY  2009    TL, for bleeding, ovaries retained    THYROIDECTOMY      TUBAL LIGATION         Family History   Problem Relation Age of Onset    Breast cancer Mother 50    Thrombophilia Father         DVTs, spontaneous    Colon cancer Neg Hx     Ovarian cancer Neg Hx        Social History     Socioeconomic History    Marital status:      Spouse name: Not on file    Number of children: Not on file    Years of education: Not on file    Highest education level: Not on file   Occupational History    Not on file   Social Needs    Financial resource strain: Not on file    Food insecurity:     Worry: Not on file     Inability: Not on file    Transportation needs:     Medical: Not on file     Non-medical: Not on file   Tobacco Use    Smoking status: Never Smoker    Smokeless tobacco: Never Used   Substance and Sexual Activity    Alcohol use: Yes     Alcohol/week: 0.0 standard drinks     Frequency: Monthly or less     Drinks per session: 1 or 2     Comment: socially    Drug use:  No    Sexual activity: Yes     Partners: Male     Birth control/protection: Surgical     Comment: hyst; mut monog   Lifestyle    Physical activity:     Days per week: Not on file     Minutes per session: Not on file    Stress: Not on file   Relationships    Social connections:     Talks on phone: Not on file     Gets together: Not on file     Attends [employer] service: Not on file     Active member of club or organization: Not on file     Attends meetings of clubs or organizations: Not on file     Relationship status: Not on file   Other Topics Concern    Not on file   Social History Narrative    Not on file       Current Outpatient Medications   Medication Sig Dispense Refill    aspirin (ECOTRIN) 81 MG EC tablet Take 81 mg by mouth once daily.      benzonatate (TESSALON) 200 MG capsule       fish oil-omega-3 fatty acids 300-1,000 mg capsule Take by mouth once daily.      yralcjru-pidz-mcq8-C-juan francisco-bosw (OSTEO BI-FLEX TRIPLE STRENGTH) 750 mg-644 mg- 30 mg-1 mg Tab       glucosamine-chondroitin 250-200 mg Tab Take 2 tablets by mouth.      levothyroxine (SYNTHROID) 100 MCG tablet 88 mcg.       lisinopril (PRINIVIL,ZESTRIL) 20 MG tablet Take 20 mg by mouth.      lisinopril-hydrochlorothiazide (PRINZIDE,ZESTORETIC) 20-12.5 mg per tablet 1 tablet.       montelukast (SINGULAIR) 10 mg tablet Take 10 mg by mouth.      multivit, Ca, min-FA-soy isofl (ONE-A-DAY MENOPAUSE FORMULA) 400-60 mcg-mg Tab       multivit, Ca, min-FA-soy isofl 400-60 mcg-mg Tab Take 1 tablet by mouth.      omeprazole (PRILOSEC) 20 MG capsule Take 20 mg by mouth once daily.       No current facility-administered medications for this visit.        Review of patient's allergies indicates:   Allergen Reactions    Codeine     Dilantin [phenytoin sodium extended]     Meperidine     Morphine     Pravastatin Other (See Comments)     Muscle aches    Sulfa (sulfonamide antibiotics)          PHYSICAL EXAM     Vitals:    01/09/20 0813   BP:  112/82   Weight: 62.1 kg (136 lb 14.5 oz)   PainSc: 0-No pain        PAIN SCALE: 0/10 None    ROS:  GENERAL: No fever, chills, fatigability or weight loss.  ABDOMEN: Appetite fine. No weight loss. Denies diarrhea, abdominal pain, hematemesis or blood in stool.  No change in bowel movement pattern.  URINARY: No flank pain, dysuria or hematuria.  REPRODUCTIVE: No abnormal vaginal bleeding.  BREASTS: Breasts symmetric, nontender and no lumps detected.    PE:   APPEARANCE: Well nourished, well developed, in no acute distress.  NECK: Neck symmetric without masses. Smooth scar from thyroidectomy      NODES: No inguinal lymph node enlargement.  ABDOMEN: Soft. No tenderness or masses.  No hernias.  BREASTS: Symmetrical, no skin changes or visible lesions. No palpable masses, nipple discharge or adenopathy bilaterally.    PELVIC:   VULVA: No lesions. Normal female genitalia.  URETHRAL MEATUS: Normal size and location, no lesions, no prolapse.  URETHRA: No masses, tenderness, prolapse or scarring.  PELVIC: Normal external female genitalia without lesions. Normal hair distribution. Adequate perineal body, normal urethral meatus. Vagina moist and well rugated without lesions or discharge. No significant cystocele or rectocele. Uterus and cervix surgically absent. Bimanual exam revealed no masses, tenderness or abnormality.    ANUS, PERINEUM: no masses, external hemorrhoids noted.       DIAGNOSIS:   1. Annual exam      PLAN:    Mammogram  Colonoscopy  dexa     MEDICATIONS PRESCRIBED: calcium vitamin D weight bearing exercise       COUNSELING:  Patient was counseled today on A.C.S. Pap guidelines and recommendations for yearly pelvic exams, mammograms and monthly self breast exams; to see her PCP for other health maintenance.     Pt counseled on signs and symptoms of cancer of the pelvic organs ie.ovarian/peritoneal, and to alert myself and my office if pt has any vaginal bleeding, pelvic/abdominal pain, persistent bloating,  unexplained constipation, unexplained appetite and/or weight loss.          FOLLOW-UP: With me in 1 year.

## 2020-01-13 ENCOUNTER — PATIENT MESSAGE (OUTPATIENT)
Dept: OBSTETRICS AND GYNECOLOGY | Facility: CLINIC | Age: 55
End: 2020-01-13

## 2020-01-13 NOTE — PROGRESS NOTES
Mammogram normal but radiology calculated risk is over 20%.  Please schedule with Jany Anderson for counseling on possible further testing.

## 2020-01-15 ENCOUNTER — TELEPHONE (OUTPATIENT)
Dept: OBSTETRICS AND GYNECOLOGY | Facility: CLINIC | Age: 55
End: 2020-01-15

## 2020-01-15 RX ORDER — LEVOTHYROXINE SODIUM 88 UG/1
88 TABLET ORAL
Qty: 30 TABLET | Refills: 1 | Status: SHIPPED | OUTPATIENT
Start: 2020-01-15

## 2020-01-15 NOTE — TELEPHONE ENCOUNTER
----- Message from Cr Carvajal sent at 1/15/2020  6:58 AM CST -----  Contact: pt   Type:  Patient Returning Call    Who Called:VANESSA PACHECO   Who Left Message for Patient: nurse   Does the patient know what this is regarding?:   Would the patient rather a call back or a response via [facility]?  Call   Best Call Back Number:[phone] (work) this is the pt work number please ask for her   Additional Information:

## 2020-01-24 ENCOUNTER — PATIENT MESSAGE (OUTPATIENT)
Dept: SURGERY | Facility: CLINIC | Age: 55
End: 2020-01-24

## 2020-02-02 ENCOUNTER — PATIENT MESSAGE (OUTPATIENT)
Dept: SURGERY | Facility: CLINIC | Age: 55
End: 2020-02-02

## 2020-02-03 ENCOUNTER — TELEPHONE (OUTPATIENT)
Dept: SURGERY | Facility: CLINIC | Age: 55
End: 2020-02-03

## 2020-02-03 NOTE — TELEPHONE ENCOUNTER
----- Message from Johnna Sinclair sent at 2/3/2020 12:48 PM CST -----  Contact: Patient  Patient would like to cancel her 4:30 pm.appointment, she had a dental emergency. Please call to advise at Ph .[phone]
